# Patient Record
Sex: FEMALE | Race: WHITE | NOT HISPANIC OR LATINO | Employment: FULL TIME | URBAN - METROPOLITAN AREA
[De-identification: names, ages, dates, MRNs, and addresses within clinical notes are randomized per-mention and may not be internally consistent; named-entity substitution may affect disease eponyms.]

---

## 2018-05-30 ENCOUNTER — OFFICE VISIT (OUTPATIENT)
Dept: INTERNAL MEDICINE | Facility: CLINIC | Age: 43
End: 2018-05-30

## 2018-05-30 VITALS
HEIGHT: 67 IN | OXYGEN SATURATION: 99 % | SYSTOLIC BLOOD PRESSURE: 124 MMHG | HEART RATE: 68 BPM | DIASTOLIC BLOOD PRESSURE: 72 MMHG

## 2018-05-30 DIAGNOSIS — H66.003 ACUTE SUPPURATIVE OTITIS MEDIA OF BOTH EARS WITHOUT SPONTANEOUS RUPTURE OF TYMPANIC MEMBRANES, RECURRENCE NOT SPECIFIED: Primary | ICD-10-CM

## 2018-05-30 PROCEDURE — 99203 OFFICE O/P NEW LOW 30 MIN: CPT | Performed by: NURSE PRACTITIONER

## 2018-05-30 RX ORDER — AMOXICILLIN AND CLAVULANATE POTASSIUM 875; 125 MG/1; MG/1
1 TABLET, FILM COATED ORAL EVERY 12 HOURS SCHEDULED
Qty: 20 TABLET | Refills: 0 | Status: SHIPPED | OUTPATIENT
Start: 2018-05-30 | End: 2018-06-30

## 2018-05-30 NOTE — PROGRESS NOTES
"CHIEF COMPLAINT  Chief Complaint   Patient presents with   • Earache     Bilateral, went from the left ear to the right. Sx started this morning.        HPI  Angela Davis is a 43 y.o. female  presents with complaint of 1 day hx of left ear pain; h/a; nasal congestion; has severe allergies--takes xyzal and allergy injections; no fever, dizziness, cough.      Past Medical History:   Diagnosis Date   • Allergy    • Hypertension        History reviewed. No pertinent family history.    Social History     Social History   • Marital status: Single     Spouse name: N/A   • Number of children: N/A   • Years of education: N/A     Occupational History   • Not on file.     Social History Main Topics   • Smoking status: Former Smoker   • Smokeless tobacco: Never Used   • Alcohol use Yes   • Drug use: No   • Sexual activity: Yes     Other Topics Concern   • Not on file     Social History Narrative   • No narrative on file       ROS  Review of Systems   Constitutional: Positive for appetite change and fatigue. Negative for activity change and fever.   HENT: Positive for congestion, ear pain and postnasal drip. Negative for sinus pain, sinus pressure and sore throat.    Respiratory: Negative for cough, chest tightness, shortness of breath and wheezing.    Gastrointestinal: Positive for nausea.   Neurological: Positive for headaches. Negative for dizziness.   All other systems reviewed and are negative.      /72   Pulse 68   Ht 170.2 cm (67\")   SpO2 99%     PHYSICAL EXAM  Physical Exam   Constitutional: She is oriented to person, place, and time. She appears well-developed and well-nourished.   HENT:   Head: Normocephalic and atraumatic.   Right Ear: External ear and ear canal normal.   Left Ear: External ear and ear canal normal.   Mouth/Throat: Uvula is midline.   HENT--bilat TMs have purulent effusion with severe erythema; no sinus tenderness; oropharynx is mildly erythematous with mild PND; no exudate   Eyes: " Conjunctivae are normal.   Neck: Normal range of motion. Neck supple.   Cardiovascular: Normal rate, regular rhythm and normal heart sounds.    No murmur heard.  Pulmonary/Chest: Effort normal and breath sounds normal.   Lymphadenopathy:   Left ant cervical node enlargement w/tenderness     Neurological: She is alert and oriented to person, place, and time.   Skin: Skin is warm, dry and intact.   Vitals reviewed.      ASSESSMENT/PLAN  1. Acute suppurative otitis media of both ears without spontaneous rupture of tympanic membranes, recurrence not specified  - amoxicillin-clavulanate (AUGMENTIN) 875-125 MG per tablet; Take 1 tablet by mouth Every 12 (Twelve) Hours.  Dispense: 20 tablet; Refill: 0  -continue Dymista nasal spray (has at home) and xyzal daily  -may also take sudafed 30 mg BID prn congestion and ear fullness      FOLLOW-UP  Prn     RTC sooner as needed.    Patient verbalizes understanding of medication dosage, comfort measures, instructions for treatment and follow-up.    Janet Campos, APRN  05/30/2018

## 2018-07-16 ENCOUNTER — HOSPITAL ENCOUNTER (OUTPATIENT)
Dept: GENERAL RADIOLOGY | Facility: HOSPITAL | Age: 43
Discharge: HOME | End: 2018-07-16
Admitting: NURSE PRACTITIONER

## 2018-07-16 ENCOUNTER — OFFICE VISIT (OUTPATIENT)
Dept: INTERNAL MEDICINE | Facility: CLINIC | Age: 43
End: 2018-07-16

## 2018-07-16 VITALS
BODY MASS INDEX: 45.36 KG/M2 | OXYGEN SATURATION: 96 % | HEIGHT: 67 IN | WEIGHT: 289 LBS | DIASTOLIC BLOOD PRESSURE: 82 MMHG | SYSTOLIC BLOOD PRESSURE: 156 MMHG | HEART RATE: 82 BPM

## 2018-07-16 DIAGNOSIS — R60.0 EDEMA EXTREMITIES: ICD-10-CM

## 2018-07-16 DIAGNOSIS — R06.09 DYSPNEA ON EXERTION: ICD-10-CM

## 2018-07-16 DIAGNOSIS — R07.89 CHEST TIGHTNESS: ICD-10-CM

## 2018-07-16 DIAGNOSIS — T78.40XA ALLERGIC REACTION, INITIAL ENCOUNTER: ICD-10-CM

## 2018-07-16 DIAGNOSIS — I10 ESSENTIAL HYPERTENSION: Primary | ICD-10-CM

## 2018-07-16 PROCEDURE — 93000 ELECTROCARDIOGRAM COMPLETE: CPT | Performed by: NURSE PRACTITIONER

## 2018-07-16 PROCEDURE — 99214 OFFICE O/P EST MOD 30 MIN: CPT | Performed by: NURSE PRACTITIONER

## 2018-07-16 PROCEDURE — 71046 X-RAY EXAM CHEST 2 VIEWS: CPT

## 2018-07-16 RX ORDER — VALSARTAN 80 MG/1
80 TABLET ORAL DAILY
Qty: 30 TABLET | Refills: 2 | Status: SHIPPED | OUTPATIENT
Start: 2018-07-16 | End: 2018-08-17 | Stop reason: ALTCHOICE

## 2018-07-16 RX ORDER — HYDROCHLOROTHIAZIDE 25 MG/1
TABLET ORAL
Qty: 60 TABLET | Refills: 1 | Status: SHIPPED | OUTPATIENT
Start: 2018-07-16 | End: 2018-08-17

## 2018-07-16 RX ORDER — LOSARTAN POTASSIUM AND HYDROCHLOROTHIAZIDE 25; 100 MG/1; MG/1
1 TABLET ORAL DAILY
COMMUNITY
End: 2018-07-16 | Stop reason: ALTCHOICE

## 2018-07-16 RX ORDER — ALBUTEROL SULFATE 90 UG/1
2 AEROSOL, METERED RESPIRATORY (INHALATION) EVERY 6 HOURS PRN
Qty: 1 INHALER | Refills: 3 | OUTPATIENT
Start: 2018-07-16 | End: 2019-01-16

## 2018-07-16 RX ORDER — PREDNISONE 10 MG/1
TABLET ORAL
Qty: 30 TABLET | Refills: 0 | Status: SHIPPED | OUTPATIENT
Start: 2018-07-16 | End: 2018-07-20

## 2018-07-16 NOTE — PROGRESS NOTES
"CHIEF COMPLAINT  Chief Complaint   Patient presents with   • Edema     Hands and feet; sx started friday       HPI  Angela Davis is a 43 y.o. female  presents with complaint of worsening generalized edema.    C/o mild swelling of hands/feet/face since Friday; BP elevated over the weekend at 170s/90s; currently taking losartan 25 mg daily, APRN at [facility] started HCTZ 12.5 mg daily for her edema on 6/30/18    Feels as if she cannot get a good deep breath; becomes winded with walking up stairs, doing laundry, or other activity    Reports same sx heard 2.5 wks ago; he was seen at [facility] urgent care  (6/30/18); diagnosed with allergic reaction, tx'd with prednisone and HCTZ, symptoms did improve    Pertinent negatives--cough, wheezing, palpitations, chest pain; denies hx of asthma, cardiac disease, or other pulmonary disease.    Past Medical History:   Diagnosis Date   • Allergy    • Hypertension        No family history on file.    Social History     Social History   • Marital status: Single     Spouse name: N/A   • Number of children: N/A   • Years of education: N/A     Occupational History   • Not on file.     Social History Main Topics   • Smoking status: Former Smoker   • Smokeless tobacco: Never Used   • Alcohol use Yes   • Drug use: No   • Sexual activity: Yes     Other Topics Concern   • Not on file     Social History Narrative   • No narrative on file       ROS  Review of Systems   Constitutional: Positive for activity change.   Respiratory: Positive for chest tightness (constricted; can't get deep breath) and shortness of breath. Negative for cough.    Cardiovascular: Positive for leg swelling. Negative for chest pain and palpitations.   Neurological: Positive for headaches. Negative for dizziness.   All other systems reviewed and are negative.      /82   Pulse 82   Ht 170.2 cm (67\")   Wt 131 kg (289 lb)   SpO2 96%   BMI 45.26 kg/m²     PHYSICAL EXAM  Physical Exam   Constitutional: She is " oriented to person, place, and time. She appears well-developed and well-nourished.   HENT:   Head: Normocephalic and atraumatic.   Eyes: Conjunctivae are normal.   Neck: Trachea normal and normal range of motion. Neck supple. No JVD present. No thyromegaly present.   Cardiovascular: Normal rate, regular rhythm and normal heart sounds.    No murmur heard.  Mild non-pitting edema in bilat hands, feet, legs   Pulmonary/Chest: Effort normal.   Decreased lung sound in bases; no rhonchi, rales, or wheezes noted   Neurological: She is alert and oriented to person, place, and time.   Skin: Skin is warm, dry and intact.   Vitals reviewed.      ECG 12 Lead  Date/Time: 8/2/2018 8:40 PM  Performed by: MARTINA WESLEY  Authorized by: MARTINA WESLEY   Comparison: not compared with previous ECG   Previous ECG: no previous ECG available  Rhythm: sinus rhythm  Rate: normal  Conduction: conduction normal  ST Segments: ST segments normal  T Waves: T waves normal  QRS axis: normal  Other: no other findings  Clinical impression: normal ECG                ASSESSMENT/PLAN  1. Edema extremities  - hydrochlorothiazide (HYDRODIURIL) 25 MG tablet; 1 tablet daily, may take 1/2 tablet extra 2-3 times/week prn swelling  Dispense: 60 tablet; Refill: 1  - Adult Transthoracic Echo Complete W/ Cont if Necessary Per Protocol; Future--d/t abnormal chest xray result    2. Allergic reaction, initial encounter  -potential allergic reaction  -Prednisone 10 mg tabs--4 po x 3d, 3po x 3d, 2po,x 3d, 1 po x 3d    3. Essential hypertension  -stop losartan  - valsartan (DIOVAN) 80 MG tablet; Take 1 tablet by mouth Daily.  Dispense: 30 tablet; Refill: 2    4. Chest tightness  - albuterol (PROVENTIL HFA;VENTOLIN HFA) 108 (90 Base) MCG/ACT inhaler; Inhale 2 puffs Every 6 (Six) Hours As Needed for Shortness of Air.  Dispense: 1 inhaler; Refill: 3  - XR Chest PA & Lateral; Future  **chest xray result--enlarged heart with increased pulmonary vascularity--ECHO ordered  for further evaluation    5. Dyspnea on exertion  -d/t abnormal chest xray  - Adult Transthoracic Echo Complete W/ Cont if Necessary Per Protocol; Future      FOLLOW-UP  6 week(s) recheck for chronic health conditions; f/u sooner if abnormal ECHO or worsening of present symptoms--if increased swelling, shortness of breath, chest pain, chest pressure, etc, go to  ER for evaluation.    RTC sooner as needed.    Patient verbalizes understanding of medication dosage, comfort measures, instructions for treatment and follow-up.    Janet Campos, APRN  07/16/2018

## 2018-07-17 ENCOUNTER — TELEPHONE (OUTPATIENT)
Dept: INTERNAL MEDICINE | Facility: CLINIC | Age: 43
End: 2018-07-17

## 2018-07-17 ENCOUNTER — HOSPITAL ENCOUNTER (OUTPATIENT)
Dept: CARDIOLOGY | Facility: HOSPITAL | Age: 43
Discharge: HOME | End: 2018-07-17
Admitting: NURSE PRACTITIONER

## 2018-07-17 DIAGNOSIS — R60.0 EDEMA EXTREMITIES: ICD-10-CM

## 2018-07-17 DIAGNOSIS — R07.89 CHEST TIGHTNESS: ICD-10-CM

## 2018-07-17 DIAGNOSIS — R06.09 DYSPNEA ON EXERTION: ICD-10-CM

## 2018-07-17 LAB
BH CV ECHO MEAS - AO ROOT AREA (BSA CORRECTED): 1.2
BH CV ECHO MEAS - AO ROOT AREA: 6.8 CM^2
BH CV ECHO MEAS - AO ROOT DIAM: 2.9 CM
BH CV ECHO MEAS - BSA(HAYCOCK): 2.6 M^2
BH CV ECHO MEAS - BSA: 2.4 M^2
BH CV ECHO MEAS - BZI_BMI: 45.3 KILOGRAMS/M^2
BH CV ECHO MEAS - BZI_METRIC_HEIGHT: 170.2 CM
BH CV ECHO MEAS - BZI_METRIC_WEIGHT: 131.1 KG
BH CV ECHO MEAS - EDV(CUBED): 109.5 ML
BH CV ECHO MEAS - EDV(TEICH): 106.7 ML
BH CV ECHO MEAS - EF(CUBED): 77.5 %
BH CV ECHO MEAS - EF(TEICH): 69.6 %
BH CV ECHO MEAS - ESV(CUBED): 24.6 ML
BH CV ECHO MEAS - ESV(TEICH): 32.4 ML
BH CV ECHO MEAS - FS: 39.2 %
BH CV ECHO MEAS - IVS/LVPW: 0.98
BH CV ECHO MEAS - IVSD: 1.2 CM
BH CV ECHO MEAS - LA DIMENSION: 3.4 CM
BH CV ECHO MEAS - LA/AO: 1.2
BH CV ECHO MEAS - LV MASS(C)D: 223.1 GRAMS
BH CV ECHO MEAS - LV MASS(C)DI: 94.3 GRAMS/M^2
BH CV ECHO MEAS - LVIDD: 4.8 CM
BH CV ECHO MEAS - LVIDS: 2.9 CM
BH CV ECHO MEAS - LVPWD: 1.2 CM
BH CV ECHO MEAS - MV A MAX VEL: 89.3 CM/SEC
BH CV ECHO MEAS - MV DEC SLOPE: 402.5 CM/SEC^2
BH CV ECHO MEAS - MV DEC TIME: 0.15 SEC
BH CV ECHO MEAS - MV E MAX VEL: 115.5 CM/SEC
BH CV ECHO MEAS - MV E/A: 1.3
BH CV ECHO MEAS - MV P1/2T MAX VEL: 127.6 CM/SEC
BH CV ECHO MEAS - MV P1/2T: 92.8 MSEC
BH CV ECHO MEAS - MVA P1/2T LCG: 1.7 CM^2
BH CV ECHO MEAS - MVA(P1/2T): 2.4 CM^2
BH CV ECHO MEAS - PA ACC SLOPE: 780.6 CM/SEC^2
BH CV ECHO MEAS - PA ACC TIME: 0.13 SEC
BH CV ECHO MEAS - PA PR(ACCEL): 18.4 MMHG
BH CV ECHO MEAS - RAP SYSTOLE: 3 MMHG
BH CV ECHO MEAS - RV MAX PG: 3.3 MMHG
BH CV ECHO MEAS - RV V1 MAX: 91.3 CM/SEC
BH CV ECHO MEAS - RVDD: 4.1 CM
BH CV ECHO MEAS - RVSP: 28 MMHG
BH CV ECHO MEAS - SI(CUBED): 35.9 ML/M^2
BH CV ECHO MEAS - SI(TEICH): 31.4 ML/M^2
BH CV ECHO MEAS - SV(CUBED): 84.9 ML
BH CV ECHO MEAS - SV(TEICH): 74.2 ML
BH CV ECHO MEAS - TAPSE (>1.6): 2.6 CM2
BH CV ECHO MEAS - TR MAX VEL: 250.4 CM/SEC
BH CV XLRA - RV BASE: 4.1 CM
BH CV XLRA - RV LENGTH: 7.4 CM
BH CV XLRA - RV MID: 3.5 CM
BH CV XLRA - TDI S': 14.6 CM/SEC
LEFT ATRIUM VOLUME INDEX: 24.6 ML/M2
LEFT ATRIUM VOLUME: 56 CM3
LV EF 2D ECHO EST: 60 %
MAXIMAL PREDICTED HEART RATE: 178 BPM
STRESS TARGET HR: 151 BPM

## 2018-07-17 PROCEDURE — 93306 TTE W/DOPPLER COMPLETE: CPT | Performed by: INTERNAL MEDICINE

## 2018-07-17 PROCEDURE — 93306 TTE W/DOPPLER COMPLETE: CPT

## 2018-07-18 NOTE — TELEPHONE ENCOUNTER
According to the ECHO report, cardiac function is normal; I will discuss with Dr. Cordon to see what other tests may need to performed to evaluate her symptoms; does she still have the swelling?  Is she still having difficulty getting a deep breath with the use of the albuterol inhaler?

## 2018-07-18 NOTE — TELEPHONE ENCOUNTER
According to result note patient was notified of Xray results. Patient called back today wanting to know her Echo results. Advised that Janet DOUGLAS is not in the office today but I would send her a back a message to review the echo and once reviewed we would give the patient a call back. Pt verbalized understanding.

## 2018-07-19 ENCOUNTER — APPOINTMENT (OUTPATIENT)
Dept: GENERAL RADIOLOGY | Facility: HOSPITAL | Age: 43
End: 2018-07-19

## 2018-07-19 ENCOUNTER — HOSPITAL ENCOUNTER (EMERGENCY)
Facility: HOSPITAL | Age: 43
Discharge: HOME | End: 2018-07-19
Attending: EMERGENCY MEDICINE | Admitting: EMERGENCY MEDICINE

## 2018-07-19 ENCOUNTER — TELEPHONE (OUTPATIENT)
Dept: INTERNAL MEDICINE | Facility: CLINIC | Age: 43
End: 2018-07-19

## 2018-07-19 VITALS
RESPIRATION RATE: 18 BRPM | DIASTOLIC BLOOD PRESSURE: 81 MMHG | HEIGHT: 67 IN | SYSTOLIC BLOOD PRESSURE: 148 MMHG | WEIGHT: 288 LBS | TEMPERATURE: 98.8 F | HEART RATE: 54 BPM | OXYGEN SATURATION: 92 % | BODY MASS INDEX: 45.2 KG/M2

## 2018-07-19 DIAGNOSIS — R60.0 PEDAL EDEMA: ICD-10-CM

## 2018-07-19 DIAGNOSIS — J81.0 ACUTE PULMONARY EDEMA (HCC): Primary | ICD-10-CM

## 2018-07-19 LAB
ALBUMIN SERPL-MCNC: 3.98 G/DL (ref 3.2–4.8)
ALBUMIN/GLOB SERPL: 1.4 G/DL (ref 1.5–2.5)
ALP SERPL-CCNC: 66 U/L (ref 25–100)
ALT SERPL W P-5'-P-CCNC: 45 U/L (ref 7–40)
ANION GAP SERPL CALCULATED.3IONS-SCNC: 6 MMOL/L (ref 3–11)
AST SERPL-CCNC: 24 U/L (ref 0–33)
BASOPHILS # BLD AUTO: 0.04 10*3/MM3 (ref 0–0.2)
BASOPHILS NFR BLD AUTO: 0.4 % (ref 0–1)
BILIRUB SERPL-MCNC: 0.4 MG/DL (ref 0.3–1.2)
BILIRUB UR QL STRIP: NEGATIVE
BNP SERPL-MCNC: 232 PG/ML (ref 0–100)
BUN BLD-MCNC: 26 MG/DL (ref 9–23)
BUN/CREAT SERPL: 21.5 (ref 7–25)
CALCIUM SPEC-SCNC: 9 MG/DL (ref 8.7–10.4)
CHLORIDE SERPL-SCNC: 108 MMOL/L (ref 99–109)
CLARITY UR: CLEAR
CO2 SERPL-SCNC: 26 MMOL/L (ref 20–31)
COLOR UR: YELLOW
CREAT BLD-MCNC: 1.21 MG/DL (ref 0.6–1.3)
DEPRECATED RDW RBC AUTO: 47 FL (ref 37–54)
EOSINOPHIL # BLD AUTO: 0.09 10*3/MM3 (ref 0–0.3)
EOSINOPHIL NFR BLD AUTO: 0.9 % (ref 0–3)
ERYTHROCYTE [DISTWIDTH] IN BLOOD BY AUTOMATED COUNT: 14.4 % (ref 11.3–14.5)
GFR SERPL CREATININE-BSD FRML MDRD: 49 ML/MIN/1.73
GLOBULIN UR ELPH-MCNC: 2.8 GM/DL
GLUCOSE BLD-MCNC: 123 MG/DL (ref 70–100)
GLUCOSE UR STRIP-MCNC: NEGATIVE MG/DL
HCT VFR BLD AUTO: 32.8 % (ref 34.5–44)
HGB BLD-MCNC: 10.7 G/DL (ref 11.5–15.5)
HGB UR QL STRIP.AUTO: NEGATIVE
IMM GRANULOCYTES # BLD: 0.1 10*3/MM3 (ref 0–0.03)
IMM GRANULOCYTES NFR BLD: 1 % (ref 0–0.6)
KETONES UR QL STRIP: NEGATIVE
LEUKOCYTE ESTERASE UR QL STRIP.AUTO: NEGATIVE
LYMPHOCYTES # BLD AUTO: 1.3 10*3/MM3 (ref 0.6–4.8)
LYMPHOCYTES NFR BLD AUTO: 12.4 % (ref 24–44)
MCH RBC QN AUTO: 29.7 PG (ref 27–31)
MCHC RBC AUTO-ENTMCNC: 32.6 G/DL (ref 32–36)
MCV RBC AUTO: 91.1 FL (ref 80–99)
MONOCYTES # BLD AUTO: 0.4 10*3/MM3 (ref 0–1)
MONOCYTES NFR BLD AUTO: 3.8 % (ref 0–12)
NEUTROPHILS # BLD AUTO: 8.64 10*3/MM3 (ref 1.5–8.3)
NEUTROPHILS NFR BLD AUTO: 82.5 % (ref 41–71)
NITRITE UR QL STRIP: NEGATIVE
PH UR STRIP.AUTO: <=5 [PH] (ref 5–8)
PLATELET # BLD AUTO: 183 10*3/MM3 (ref 150–450)
PMV BLD AUTO: 10.4 FL (ref 6–12)
POTASSIUM BLD-SCNC: 4.3 MMOL/L (ref 3.5–5.5)
PROT SERPL-MCNC: 6.8 G/DL (ref 5.7–8.2)
PROT UR QL STRIP: ABNORMAL
RBC # BLD AUTO: 3.6 10*6/MM3 (ref 3.89–5.14)
SODIUM BLD-SCNC: 140 MMOL/L (ref 132–146)
SP GR UR STRIP: 1.01 (ref 1–1.03)
TROPONIN I SERPL-MCNC: <0.006 NG/ML
UROBILINOGEN UR QL STRIP: ABNORMAL
WBC NRBC COR # BLD: 10.47 10*3/MM3 (ref 3.5–10.8)

## 2018-07-19 PROCEDURE — 99284 EMERGENCY DEPT VISIT MOD MDM: CPT

## 2018-07-19 PROCEDURE — 84484 ASSAY OF TROPONIN QUANT: CPT | Performed by: NURSE PRACTITIONER

## 2018-07-19 PROCEDURE — 83880 ASSAY OF NATRIURETIC PEPTIDE: CPT | Performed by: NURSE PRACTITIONER

## 2018-07-19 PROCEDURE — 96374 THER/PROPH/DIAG INJ IV PUSH: CPT

## 2018-07-19 PROCEDURE — 71046 X-RAY EXAM CHEST 2 VIEWS: CPT

## 2018-07-19 PROCEDURE — 81003 URINALYSIS AUTO W/O SCOPE: CPT | Performed by: NURSE PRACTITIONER

## 2018-07-19 PROCEDURE — 93005 ELECTROCARDIOGRAM TRACING: CPT | Performed by: NURSE PRACTITIONER

## 2018-07-19 PROCEDURE — 94640 AIRWAY INHALATION TREATMENT: CPT

## 2018-07-19 PROCEDURE — 85025 COMPLETE CBC W/AUTO DIFF WBC: CPT | Performed by: NURSE PRACTITIONER

## 2018-07-19 PROCEDURE — 25010000002 FUROSEMIDE PER 20 MG: Performed by: NURSE PRACTITIONER

## 2018-07-19 PROCEDURE — 80053 COMPREHEN METABOLIC PANEL: CPT | Performed by: NURSE PRACTITIONER

## 2018-07-19 RX ORDER — IPRATROPIUM BROMIDE AND ALBUTEROL SULFATE 2.5; .5 MG/3ML; MG/3ML
3 SOLUTION RESPIRATORY (INHALATION) ONCE
Status: COMPLETED | OUTPATIENT
Start: 2018-07-19 | End: 2018-07-19

## 2018-07-19 RX ORDER — FUROSEMIDE 10 MG/ML
40 INJECTION INTRAMUSCULAR; INTRAVENOUS ONCE
Status: COMPLETED | OUTPATIENT
Start: 2018-07-19 | End: 2018-07-19

## 2018-07-19 RX ORDER — SODIUM CHLORIDE 0.9 % (FLUSH) 0.9 %
10 SYRINGE (ML) INJECTION AS NEEDED
Status: DISCONTINUED | OUTPATIENT
Start: 2018-07-19 | End: 2018-07-19 | Stop reason: HOSPADM

## 2018-07-19 RX ORDER — FUROSEMIDE 20 MG/1
20 TABLET ORAL DAILY
Qty: 5 TABLET | Refills: 0 | Status: SHIPPED | OUTPATIENT
Start: 2018-07-19 | End: 2018-07-24 | Stop reason: SDUPTHER

## 2018-07-19 RX ADMIN — FUROSEMIDE 40 MG: 10 INJECTION, SOLUTION INTRAMUSCULAR; INTRAVENOUS at 11:22

## 2018-07-19 RX ADMIN — IPRATROPIUM BROMIDE AND ALBUTEROL SULFATE 3 ML: 2.5; .5 SOLUTION RESPIRATORY (INHALATION) at 11:19

## 2018-07-19 NOTE — TELEPHONE ENCOUNTER
Spoke with patient regarding her ECHO report. Pt verbalized understanding. Patient then mentioned that she was at the [facility] ED because her legs kept swelling. She is not having so much an issue anymore catching her breath but the swelling got worse, so they ED are running more tests right now. I advised that Janet Campos STELLA will be able to see all the tests that they run in the ER but I would send a message back to give her an update. I explained that soon we would give her a follow up call and that if she needed our assistance to give the office a call back. Pt verbalized understanding.

## 2018-07-19 NOTE — TELEPHONE ENCOUNTER
The result was called to patient at 10:46 this morning--ECHO was normal; however, she continued to have swelling, so she went to the ER for further evaluation.

## 2018-07-20 ENCOUNTER — OFFICE VISIT (OUTPATIENT)
Dept: INTERNAL MEDICINE | Facility: CLINIC | Age: 43
End: 2018-07-20

## 2018-07-20 VITALS
OXYGEN SATURATION: 93 % | DIASTOLIC BLOOD PRESSURE: 86 MMHG | HEIGHT: 67 IN | SYSTOLIC BLOOD PRESSURE: 150 MMHG | BODY MASS INDEX: 45.99 KG/M2 | HEART RATE: 66 BPM | WEIGHT: 293 LBS

## 2018-07-20 DIAGNOSIS — J81.0 ACUTE PULMONARY EDEMA (HCC): Primary | ICD-10-CM

## 2018-07-20 PROBLEM — I10 HYPERTENSION: Status: ACTIVE | Noted: 2018-07-20

## 2018-07-20 PROCEDURE — 99213 OFFICE O/P EST LOW 20 MIN: CPT | Performed by: NURSE PRACTITIONER

## 2018-07-20 NOTE — PROGRESS NOTES
"CHIEF COMPLAINT  Edema (Hands and Feet)      HPI  Angela Davis is a 43 y.o. female is here today for follow-up after ER visit at  for pulmonary edema.    Still has some mild shortness of breath; mild pressure in chest; BNP was elevated at ER; prescribed short course of Lasix 20 mg daily x 5 days; still has some mild edema of ankles/feet and hands; has appt with cardiology on Tuesday--will f/u afterward as needed      Past Medical History:   Diagnosis Date   • Allergy    • Hypertension        Past Surgical History:   Procedure Laterality Date   • TONSILLECTOMY         Family History   Problem Relation Age of Onset   • Coronary artery disease Father 45        CAGB X3   • Heart disease Maternal Grandmother    • Heart disease Maternal Grandfather        Social History     Social History   • Marital status: Single     Spouse name: N/A   • Number of children: N/A   • Years of education: N/A     Occupational History   • Not on file.     Social History Main Topics   • Smoking status: Former Smoker     Quit date: 1/11/2014   • Smokeless tobacco: Never Used   • Alcohol use Yes      Comment: social   • Drug use: No   • Sexual activity: Yes     Other Topics Concern   • Not on file     Social History Narrative    Caffeine: 2 serving per day.           The following portions of the patient's history were reviewed and updated as appropriate: allergies, current medications, past family history, past medical history, past social history, past surgical history and problem list.    ROS  Review of Systems   Constitutional: Positive for activity change.   Respiratory: Positive for chest tightness and shortness of breath.    Cardiovascular: Positive for leg swelling. Negative for chest pain and palpitations.   Neurological: Negative for dizziness and headaches.   All other systems reviewed and are negative.      /86   Pulse 66   Ht 170.2 cm (67\")   Wt 133 kg (293 lb)   SpO2 93%   BMI 45.89 kg/m²     PHYSICAL " EXAM  Physical Exam   Constitutional: She is oriented to person, place, and time. She appears well-developed and well-nourished.   HENT:   Head: Normocephalic and atraumatic.   Eyes: Conjunctivae are normal.   Neck: Trachea normal and normal range of motion. Neck supple. No JVD present. No thyromegaly present.   Cardiovascular: Normal rate, regular rhythm and normal heart sounds.    No murmur heard.  Mild non-pitting edema in bilateral feet; generalized edema of bilateral hands   Pulmonary/Chest: Effort normal.   Decreased breath sounds throughout; no rales/crackles noted   Neurological: She is alert and oriented to person, place, and time.   Skin: Skin is warm, dry and intact.   Vitals reviewed.      ASSESSMENT/PLAN    1. Acute pulmonary edema (CMS/HCC)  -continue Lasix 20 mg daily until appt with cardiology  -scheduled appt with STELLA Liang on Tuesday, 7/24/18  -f/u in office sooner if symptoms worsen      Plan of care reviewed with patient at the conclusion of today's visit. Education was provided in regards to diagnosis, management and any prescribed or recommended OTC medications.  Patient verbalizes Understanding of and agreement with management plan.    FOLLOW-UP  Will schedule f/u after cardiology appt    RTC as needed      STELLA Vazquez  07/20/2018

## 2018-07-24 ENCOUNTER — LAB (OUTPATIENT)
Dept: LAB | Facility: HOSPITAL | Age: 43
End: 2018-07-24

## 2018-07-24 ENCOUNTER — OFFICE VISIT (OUTPATIENT)
Dept: CARDIOLOGY | Facility: HOSPITAL | Age: 43
End: 2018-07-24

## 2018-07-24 ENCOUNTER — TELEPHONE (OUTPATIENT)
Dept: CARDIOLOGY | Facility: HOSPITAL | Age: 43
End: 2018-07-24

## 2018-07-24 VITALS
DIASTOLIC BLOOD PRESSURE: 92 MMHG | BODY MASS INDEX: 44.36 KG/M2 | RESPIRATION RATE: 16 BRPM | OXYGEN SATURATION: 97 % | HEART RATE: 92 BPM | TEMPERATURE: 98.5 F | HEIGHT: 67 IN | WEIGHT: 282.6 LBS | SYSTOLIC BLOOD PRESSURE: 153 MMHG

## 2018-07-24 DIAGNOSIS — R60.0 LOCALIZED EDEMA: ICD-10-CM

## 2018-07-24 DIAGNOSIS — J81.0 ACUTE PULMONARY EDEMA (HCC): ICD-10-CM

## 2018-07-24 DIAGNOSIS — Z82.49 FAMILY HISTORY OF EARLY CAD: ICD-10-CM

## 2018-07-24 DIAGNOSIS — R06.09 DOE (DYSPNEA ON EXERTION): ICD-10-CM

## 2018-07-24 DIAGNOSIS — R07.89 CHEST TIGHTNESS OR PRESSURE: ICD-10-CM

## 2018-07-24 DIAGNOSIS — I10 ESSENTIAL HYPERTENSION: ICD-10-CM

## 2018-07-24 DIAGNOSIS — E66.01 MORBID OBESITY (HCC): ICD-10-CM

## 2018-07-24 LAB
ANION GAP SERPL CALCULATED.3IONS-SCNC: 5 MMOL/L (ref 3–11)
BUN BLD-MCNC: 17 MG/DL (ref 9–23)
BUN/CREAT SERPL: 16.3 (ref 7–25)
CALCIUM SPEC-SCNC: 10.1 MG/DL (ref 8.7–10.4)
CHLORIDE SERPL-SCNC: 103 MMOL/L (ref 99–109)
CO2 SERPL-SCNC: 32 MMOL/L (ref 20–31)
CREAT BLD-MCNC: 1.04 MG/DL (ref 0.6–1.3)
GFR SERPL CREATININE-BSD FRML MDRD: 58 ML/MIN/1.73
GLUCOSE BLD-MCNC: 84 MG/DL (ref 70–100)
POTASSIUM BLD-SCNC: 4.7 MMOL/L (ref 3.5–5.5)
SODIUM BLD-SCNC: 140 MMOL/L (ref 132–146)

## 2018-07-24 PROCEDURE — 80048 BASIC METABOLIC PNL TOTAL CA: CPT

## 2018-07-24 PROCEDURE — 36415 COLL VENOUS BLD VENIPUNCTURE: CPT

## 2018-07-24 PROCEDURE — 99214 OFFICE O/P EST MOD 30 MIN: CPT | Performed by: NURSE PRACTITIONER

## 2018-07-24 RX ORDER — AZELASTINE HYDROCHLORIDE, FLUTICASONE PROPIONATE 137; 50 UG/1; UG/1
1 SPRAY, METERED NASAL DAILY PRN
COMMUNITY
End: 2019-01-16

## 2018-07-24 RX ORDER — FUROSEMIDE 20 MG/1
20 TABLET ORAL DAILY
Qty: 30 TABLET | Refills: 0 | Status: SHIPPED | OUTPATIENT
Start: 2018-07-24 | End: 2018-08-17

## 2018-07-24 NOTE — TELEPHONE ENCOUNTER
Called pt and told her that her labs are normal and it is ok to continue Lasix 20 mg for 5 days. Pt verbalized understanding.

## 2018-07-24 NOTE — PROGRESS NOTES
[facility]      Encounter Date:07/24/2018     Angela Davis  [address] [address]  [phone]    1975    STELLA Vazquez    Angela Davis is a 43 y.o. female.      Subjective:     Chief Complaint:  Follow-up (chest pain, dyspnea, pulmonary edema, edema s/p ED visit)       HPI     Patient presented to [facility] ED on 7/19/18 with complaints of dyspnea, hypertension, left extremity edema.  Echocardiogram completed 7/17/18 with normal EF, no significant valvular heart disease.  Patient had been placed on prednisone for ? Allergic reaction.  Chest x-ray on 7/16/18 showed increased pulmonary vascular congestion.  Consistent findings with repeat chest x-ray on 7/19/18.  Redness around stopped during ER visit and started on Lasix.    Continued MAGALLON with climbing stairs.  Improved since Thursday.  Still having chest discomfort, does not radiate.  Worse with exertion.  Denies palpitations.  Edema has improved.  Denies dizziness, near syncope/syncope.  Pt reports her BP has been much better was recently worsened over the last 2 months.  Had been 110-120's, now 150's.  Pt reports having RAZO today.      Pt had been working on weight and exercise until swelling began with elevated BPs. Pt has a hx of allergies, season with allegery injections.    Cardiac risk factors:  History of hypertension.  Tobacco use, sedentary lifestyle, obesity (BMI > 30),   Family history of premature coronary artery disease (male < 55 yrs, female <66 yrs)    Patient Active Problem List    Diagnosis   • Hypertension [I10]   • Acute pulmonary edema (CMS/HCC) [J81.0]         Past Surgical History:   Procedure Laterality Date   • TONSILLECTOMY         No Known Allergies      Current Outpatient Prescriptions:   •  albuterol (PROVENTIL HFA;VENTOLIN HFA) 108 (90 Base) MCG/ACT inhaler, Inhale 2 puffs Every 6 (Six) Hours As Needed for Shortness of Air., Disp: 1 inhaler, Rfl: 3  •   "Azelastine-Fluticasone (DYMISTA) 137-50 MCG/ACT suspension, 1 spray into the nostril(s) as directed by provider Daily As Needed., Disp: , Rfl:   •  furosemide (LASIX) 20 MG tablet, Take 1 tablet by mouth Daily., Disp: 30 tablet, Rfl: 0  •  hydrochlorothiazide (HYDRODIURIL) 25 MG tablet, 1 tablet daily, may take 1/2 tablet extra 2-3 times/week prn swelling, Disp: 60 tablet, Rfl: 1  •  levocetirizine (XYZAL) 5 MG tablet, Take 5 mg by mouth Every Evening., Disp: , Rfl:   •  Multiple Vitamins-Minerals (MULTIVITAMIN ADULT PO), Take 1 tablet/day by mouth., Disp: , Rfl:   •  valsartan (DIOVAN) 80 MG tablet, Take 1 tablet by mouth Daily., Disp: 30 tablet, Rfl: 2    The following portions of the patient's history were reviewed and updated as appropriate: allergies, current medications, past family history, past medical history, past social history, past surgical history and problem list.    Review of Systems   Cardiovascular: Positive for chest pain, dyspnea on exertion and leg swelling.   Respiratory: Positive for shortness of breath.    All other systems reviewed and are negative.      Objective:     Vitals:    07/24/18 0923 07/24/18 0925 07/24/18 0926   BP: 158/93 158/95 153/92   BP Location: Right arm Left arm Left arm   Patient Position: Sitting Sitting Standing   Pulse: 85  92   Resp: 16     Temp: 98.5 °F (36.9 °C)     TempSrc: Temporal Artery      SpO2: 97%     Weight: 128 kg (282 lb 9.6 oz)     Height: 170.2 cm (67\")           Physical Exam   Constitutional: She is oriented to person, place, and time. She appears well-developed and well-nourished. No distress.   HENT:   Head: Normocephalic and atraumatic.   Mouth/Throat: Oropharynx is clear and moist.   Eyes: Pupils are equal, round, and reactive to light. Conjunctivae are normal. No scleral icterus.   Neck: No hepatojugular reflux and no JVD present. Carotid bruit is not present. No tracheal deviation present. No thyromegaly present.   Cardiovascular: Normal rate, " regular rhythm, normal heart sounds and intact distal pulses.  Exam reveals no friction rub.    No murmur heard.  Pulmonary/Chest: Effort normal. She has decreased breath sounds.   Abdominal: Soft. Bowel sounds are normal. She exhibits no distension. There is no tenderness.   Musculoskeletal: She exhibits edema.   Lymphadenopathy:     She has no cervical adenopathy.   Neurological: She is alert and oriented to person, place, and time.   Skin: Skin is warm, dry and intact. No rash noted. No cyanosis or erythema. No pallor.   Psychiatric: She has a normal mood and affect. Her behavior is normal. Thought content normal.   Vitals reviewed.      Lab and Diagnostic Review:  Admission on 07/19/2018, Discharged on 07/19/2018   Component Date Value Ref Range Status   • Glucose 07/19/2018 123* 70 - 100 mg/dL Final   • BUN 07/19/2018 26* 9 - 23 mg/dL Final   • Creatinine 07/19/2018 1.21  0.60 - 1.30 mg/dL Final   • Sodium 07/19/2018 140  132 - 146 mmol/L Final   • Potassium 07/19/2018 4.3  3.5 - 5.5 mmol/L Final   • Chloride 07/19/2018 108  99 - 109 mmol/L Final   • CO2 07/19/2018 26.0  20.0 - 31.0 mmol/L Final   • Calcium 07/19/2018 9.0  8.7 - 10.4 mg/dL Final   • Total Protein 07/19/2018 6.8  5.7 - 8.2 g/dL Final   • Albumin 07/19/2018 3.98  3.20 - 4.80 g/dL Final   • ALT (SGPT) 07/19/2018 45* 7 - 40 U/L Final   • AST (SGOT) 07/19/2018 24  0 - 33 U/L Final   • Alkaline Phosphatase 07/19/2018 66  25 - 100 U/L Final   • Total Bilirubin 07/19/2018 0.4  0.3 - 1.2 mg/dL Final   • eGFR Non African Amer 07/19/2018 49* >60 mL/min/1.73 Final   • Globulin 07/19/2018 2.8  gm/dL Final   • A/G Ratio 07/19/2018 1.4* 1.5 - 2.5 g/dL Final   • BUN/Creatinine Ratio 07/19/2018 21.5  7.0 - 25.0 Final   • Anion Gap 07/19/2018 6.0  3.0 - 11.0 mmol/L Final   • BNP 07/19/2018 232.0* 0.0 - 100.0 pg/mL Final    Results may be falsely decreased if patient taking Biotin.   • Color, UA 07/19/2018 Yellow  Yellow, Straw Final   • Appearance, UA 07/19/2018  Clear  Clear Final   • pH, UA 07/19/2018 <=5.0  5.0 - 8.0 Final   • Specific Gravity, UA 07/19/2018 1.011  1.001 - 1.030 Final   • Glucose, UA 07/19/2018 Negative  Negative Final   • Ketones, UA 07/19/2018 Negative  Negative Final   • Bilirubin, UA 07/19/2018 Negative  Negative Final   • Blood, UA 07/19/2018 Negative  Negative Final   • Protein, UA 07/19/2018 Trace* Negative Final   • Leuk Esterase, UA 07/19/2018 Negative  Negative Final   • Nitrite, UA 07/19/2018 Negative  Negative Final   • Urobilinogen, UA 07/19/2018 0.2 E.U./dL  0.2 - 1.0 E.U./dL Final   • WBC 07/19/2018 10.47  3.50 - 10.80 10*3/mm3 Final   • RBC 07/19/2018 3.60* 3.89 - 5.14 10*6/mm3 Final   • Hemoglobin 07/19/2018 10.7* 11.5 - 15.5 g/dL Final   • Hematocrit 07/19/2018 32.8* 34.5 - 44.0 % Final   • MCV 07/19/2018 91.1  80.0 - 99.0 fL Final   • MCH 07/19/2018 29.7  27.0 - 31.0 pg Final   • MCHC 07/19/2018 32.6  32.0 - 36.0 g/dL Final   • RDW 07/19/2018 14.4  11.3 - 14.5 % Final   • RDW-SD 07/19/2018 47.0  37.0 - 54.0 fl Final   • MPV 07/19/2018 10.4  6.0 - 12.0 fL Final   • Platelets 07/19/2018 183  150 - 450 10*3/mm3 Final   • Neutrophil % 07/19/2018 82.5* 41.0 - 71.0 % Final   • Lymphocyte % 07/19/2018 12.4* 24.0 - 44.0 % Final   • Monocyte % 07/19/2018 3.8  0.0 - 12.0 % Final   • Eosinophil % 07/19/2018 0.9  0.0 - 3.0 % Final   • Basophil % 07/19/2018 0.4  0.0 - 1.0 % Final   • Immature Grans % 07/19/2018 1.0* 0.0 - 0.6 % Final   • Neutrophils, Absolute 07/19/2018 8.64* 1.50 - 8.30 10*3/mm3 Final   • Lymphocytes, Absolute 07/19/2018 1.30  0.60 - 4.80 10*3/mm3 Final   • Monocytes, Absolute 07/19/2018 0.40  0.00 - 1.00 10*3/mm3 Final   • Eosinophils, Absolute 07/19/2018 0.09  0.00 - 0.30 10*3/mm3 Final   • Basophils, Absolute 07/19/2018 0.04  0.00 - 0.20 10*3/mm3 Final   • Immature Grans, Absolute 07/19/2018 0.10* 0.00 - 0.03 10*3/mm3 Final   • Troponin I 07/19/2018 <0.006  <=0.039 ng/mL Final    Results may be falsely decreased if patient  taking Biotin.     7/17/18: EF 60%, cardiac valves anatomically and functionally normal  EKG 7/19/18: Sinus bradycardia 57 bpm    EXAMINATION: XR CHEST 2 VW-      INDICATION: HTN.      COMPARISON: Chest x-ray 07/16/2018.     FINDINGS: Persistent cardiomegaly with increased central pulmonary  vascularity as well as bibasilar atelectasis greatest on the left,  however, no focal consolidation or significant effusion. No  pneumothorax. Degenerative changes of the spine.      IMPRESSION:  Persistent cardiomegaly with increased pulmonary vascularity  bilaterally.     D:  07/19/2018  E:  07/19/2018     This report was finalized on 7/19/2018 12:09 PM by Dr. Ok Samayoa.       Assessment and Plan:         1. Chest tightness or pressure    - Stress Test With Pet Myocardial Perfusion - Multi Study; this week.    2. MAGALLON (dyspnea on exertion)    - Stress Test With Pet Myocardial Perfusion - Multi Study; Future    3. Acute pulmonary edema (CMS/HCC)  Normal EF on echo, no VHD  - Stress Test With Pet Myocardial Perfusion - Multi Study; Future  - furosemide (LASIX) 20 MG tablet; Take 1 tablet by mouth Daily.  Dispense: 30 tablet; Refill: 0    4. Essential hypertension  Elevated today.  Continue HCTZ, Losartan.  Keep BP and HR log.  Will uptitrate meds if needed  Discussed BP goals    5. Localized edema  improving  - Basic Metabolic Panel; Future  - furosemide (LASIX) 20 MG tablet; Take 1 tablet by mouth Daily.  Dispense: 30 tablet; Refill: 0    6. Morbid obesity (CMS/HCC)  BmI 44.3  - Stress Test With Pet Myocardial Perfusion - Multi Study; Future    7. Family history of early CAD  Father has CABGX3 in 40's.    - Stress Test With Pet Myocardial Perfusion - Multi Study; Future    Fu 4 weeks or sooner    *Please note that portions of this note were completed with a voice recognition program. Efforts were made to edit the dictations, but occasionally words are mistranscribed.

## 2018-07-24 NOTE — TELEPHONE ENCOUNTER
----- Message from STELLA Liang sent at 7/24/2018 12:43 PM EDT -----  Labs are stable.  Ok to continue Lasix 20 mg daily for 5 days.

## 2018-07-26 ENCOUNTER — HOSPITAL ENCOUNTER (OUTPATIENT)
Dept: CARDIOLOGY | Facility: HOSPITAL | Age: 43
Discharge: HOME | End: 2018-07-26
Admitting: NURSE PRACTITIONER

## 2018-07-26 ENCOUNTER — TELEPHONE (OUTPATIENT)
Dept: CARDIOLOGY | Facility: HOSPITAL | Age: 43
End: 2018-07-26

## 2018-07-26 DIAGNOSIS — R06.09 DOE (DYSPNEA ON EXERTION): ICD-10-CM

## 2018-07-26 DIAGNOSIS — E66.01 MORBID OBESITY (HCC): ICD-10-CM

## 2018-07-26 DIAGNOSIS — Z82.49 FAMILY HISTORY OF EARLY CAD: ICD-10-CM

## 2018-07-26 DIAGNOSIS — R07.89 CHEST TIGHTNESS OR PRESSURE: ICD-10-CM

## 2018-07-26 DIAGNOSIS — J81.0 ACUTE PULMONARY EDEMA (HCC): ICD-10-CM

## 2018-07-26 LAB
BH CV STRESS BP STAGE 1: NORMAL
BH CV STRESS BP STAGE 3: NORMAL
BH CV STRESS COMMENTS STAGE 1: NORMAL
BH CV STRESS DOB - ATROPINE STAGE 3: 0
BH CV STRESS DOSE REGADENOSON STAGE 1: 0.4
BH CV STRESS DURATION MIN STAGE 1: 1
BH CV STRESS DURATION MIN STAGE 2: 1
BH CV STRESS DURATION MIN STAGE 3: 1
BH CV STRESS DURATION MIN STAGE 4: 1
BH CV STRESS DURATION SEC STAGE 1: 0
BH CV STRESS DURATION SEC STAGE 2: 0
BH CV STRESS DURATION SEC STAGE 4: 0
BH CV STRESS HR STAGE 1: 96
BH CV STRESS HR STAGE 2: 100
BH CV STRESS HR STAGE 3: 98
BH CV STRESS HR STAGE 4: 96
BH CV STRESS O2 STAGE 1: 97
BH CV STRESS O2 STAGE 2: 99
BH CV STRESS O2 STAGE 3: 98
BH CV STRESS O2 STAGE 4: 98
BH CV STRESS PROTOCOL 1: NORMAL
BH CV STRESS RECOVERY BP: NORMAL MMHG
BH CV STRESS RECOVERY HR: 86 BPM
BH CV STRESS RECOVERY O2: 95 %
BH CV STRESS STAGE 1: 1
BH CV STRESS STAGE 2: 2
BH CV STRESS STAGE 3: 3
BH CV STRESS STAGE 4: 4
LV EF NUC BP: 62 %
MAXIMAL PREDICTED HEART RATE: 178 BPM
PERCENT MAX PREDICTED HR: 53.93 %
STRESS BASELINE BP: NORMAL MMHG
STRESS BASELINE HR: 82 BPM
STRESS O2 SAT REST: 97 %
STRESS PERCENT HR: 63 %
STRESS POST ESTIMATED WORKLOAD: 1 METS
STRESS POST EXERCISE DUR MIN: 4 MIN
STRESS POST EXERCISE DUR SEC: 0 SEC
STRESS POST O2 SAT PEAK: 99 %
STRESS POST PEAK BP: NORMAL MMHG
STRESS POST PEAK HR: 96 BPM
STRESS TARGET HR: 151 BPM

## 2018-07-26 PROCEDURE — 25010000002 REGADENOSON 0.4 MG/5ML SOLUTION: Performed by: NURSE PRACTITIONER

## 2018-07-26 PROCEDURE — A9555 RB82 RUBIDIUM: HCPCS | Performed by: NURSE PRACTITIONER

## 2018-07-26 PROCEDURE — 78492 MYOCRD IMG PET MLT RST&STRS: CPT | Performed by: INTERNAL MEDICINE

## 2018-07-26 PROCEDURE — 93017 CV STRESS TEST TRACING ONLY: CPT

## 2018-07-26 PROCEDURE — 0 RUBIDIUM CHLORIDE: Performed by: NURSE PRACTITIONER

## 2018-07-26 PROCEDURE — 78492 MYOCRD IMG PET MLT RST&STRS: CPT

## 2018-07-26 PROCEDURE — 93018 CV STRESS TEST I&R ONLY: CPT | Performed by: INTERNAL MEDICINE

## 2018-07-26 RX ADMIN — REGADENOSON 0.4 MG: 0.08 INJECTION, SOLUTION INTRAVENOUS at 10:12

## 2018-07-26 RX ADMIN — RUBIDIUM CHLORIDE RB-82 1 DOSE: 150 INJECTION, SOLUTION INTRAVENOUS at 10:00

## 2018-07-26 RX ADMIN — RUBIDIUM CHLORIDE RB-82 1 DOSE: 150 INJECTION, SOLUTION INTRAVENOUS at 10:10

## 2018-07-26 NOTE — TELEPHONE ENCOUNTER
----- Message from STELLA Liang sent at 7/26/2018 12:25 PM EDT -----  I am attempted to call X1    Stress test is normal.  No evidence of heart blockage, heart muscle function is normal.

## 2018-07-26 NOTE — TELEPHONE ENCOUNTER
Called pt and told her that Stress test is normal.  No evidence of heart blockage, heart muscle function is normal. She verbalized understanding.

## 2018-08-06 DIAGNOSIS — J81.0 ACUTE PULMONARY EDEMA (HCC): Primary | ICD-10-CM

## 2018-08-17 ENCOUNTER — OFFICE VISIT (OUTPATIENT)
Dept: INTERNAL MEDICINE | Facility: CLINIC | Age: 43
End: 2018-08-17

## 2018-08-17 VITALS
OXYGEN SATURATION: 97 % | HEART RATE: 87 BPM | HEIGHT: 67 IN | SYSTOLIC BLOOD PRESSURE: 128 MMHG | WEIGHT: 285 LBS | DIASTOLIC BLOOD PRESSURE: 78 MMHG | BODY MASS INDEX: 44.73 KG/M2

## 2018-08-17 DIAGNOSIS — I10 ESSENTIAL HYPERTENSION: Primary | ICD-10-CM

## 2018-08-17 PROCEDURE — 99213 OFFICE O/P EST LOW 20 MIN: CPT | Performed by: NURSE PRACTITIONER

## 2018-08-17 RX ORDER — LOSARTAN POTASSIUM AND HYDROCHLOROTHIAZIDE 25; 100 MG/1; MG/1
1 TABLET ORAL DAILY
Qty: 30 TABLET | Refills: 5 | Status: SHIPPED | OUTPATIENT
Start: 2018-08-17

## 2018-08-17 RX ORDER — LOSARTAN POTASSIUM AND HYDROCHLOROTHIAZIDE 25; 100 MG/1; MG/1
1 TABLET ORAL DAILY
COMMUNITY
End: 2018-08-17 | Stop reason: SDUPTHER

## 2018-08-17 NOTE — PROGRESS NOTES
"CHIEF COMPLAINT  Hypertension and Med Refill (Replace Valsartan)      HPI  Angela Davis is a 43 y.o. female is here today for follow-up to change BP med that was recalled.  Prev on valsartan; had losartan-HCTZ at home and has been taking this       Past Medical History:   Diagnosis Date   • Allergy    • Hypertension        Past Surgical History:   Procedure Laterality Date   • TONSILLECTOMY         Family History   Problem Relation Age of Onset   • Coronary artery disease Father 45        CAGB X3   • Heart disease Maternal Grandmother    • Heart disease Maternal Grandfather        Social History     Social History   • Marital status: Single     Spouse name: N/A   • Number of children: N/A   • Years of education: N/A     Occupational History   • Not on file.     Social History Main Topics   • Smoking status: Former Smoker     Quit date: 1/11/2014   • Smokeless tobacco: Never Used   • Alcohol use Yes      Comment: social   • Drug use: No   • Sexual activity: Yes     Other Topics Concern   • Not on file     Social History Narrative    Caffeine: 2 serving per day.           The following portions of the patient's history were reviewed and updated as appropriate: allergies, current medications, past family history, past medical history, past social history, past surgical history and problem list.    ROS  Review of Systems   Constitutional: Negative for activity change, appetite change and fatigue.   Respiratory: Negative for chest tightness, shortness of breath and wheezing.    Cardiovascular: Negative for chest pain, palpitations and leg swelling.   Endocrine: Negative for cold intolerance, heat intolerance, polydipsia, polyphagia and polyuria.   Neurological: Negative for dizziness and headaches.   All other systems reviewed and are negative.      /78   Pulse 87   Ht 170.2 cm (67\")   Wt 129 kg (285 lb)   SpO2 97%   Breastfeeding? No   BMI 44.64 kg/m²     PHYSICAL EXAM  Physical Exam "   Constitutional: She is oriented to person, place, and time. She appears well-developed and well-nourished.   HENT:   Head: Normocephalic and atraumatic.   Eyes: Conjunctivae are normal.   Neck: Trachea normal and normal range of motion. Neck supple. No JVD present. No thyromegaly present.   Cardiovascular: Normal rate, regular rhythm and normal heart sounds.    No murmur heard.  No swelling in BLE   Pulmonary/Chest: Effort normal and breath sounds normal.   Neurological: She is alert and oriented to person, place, and time.   Skin: Skin is warm, dry and intact.   Vitals reviewed.        ASSESSMENT/PLAN    1. Essential hypertension  -valsartan discontinue d/t recall--pt received letter  - losartan-hydrochlorothiazide (HYZAAR) 100-25 MG per tablet; Take 1 tablet by mouth Daily.  Dispense: 30 tablet; Refill: 5      Plan of care reviewed with patient at the conclusion of today's visit. Education was provided in regards to diagnosis, management and any prescribed or recommended OTC medications.  Patient verbalizes Understanding of and agreement with management plan.    FOLLOW-UP  6 month(s) recheck    RTC as needed      Janet Campos, STELLA  08/17/2018

## 2018-08-27 ENCOUNTER — OFFICE VISIT (OUTPATIENT)
Dept: CARDIOLOGY | Facility: HOSPITAL | Age: 43
End: 2018-08-27

## 2018-08-27 VITALS
HEART RATE: 74 BPM | SYSTOLIC BLOOD PRESSURE: 140 MMHG | HEIGHT: 67 IN | TEMPERATURE: 97.8 F | DIASTOLIC BLOOD PRESSURE: 87 MMHG | BODY MASS INDEX: 45.24 KG/M2 | OXYGEN SATURATION: 95 % | WEIGHT: 288.2 LBS | RESPIRATION RATE: 18 BRPM

## 2018-08-27 DIAGNOSIS — R06.09 DOE (DYSPNEA ON EXERTION): ICD-10-CM

## 2018-08-27 DIAGNOSIS — R07.89 CHEST TIGHTNESS: ICD-10-CM

## 2018-08-27 DIAGNOSIS — R60.0 EDEMA EXTREMITIES: ICD-10-CM

## 2018-08-27 DIAGNOSIS — I10 ESSENTIAL HYPERTENSION: ICD-10-CM

## 2018-08-27 PROCEDURE — 99214 OFFICE O/P EST MOD 30 MIN: CPT | Performed by: NURSE PRACTITIONER

## 2018-08-27 RX ORDER — MULTIVIT WITH MINERALS/LUTEIN
250 TABLET ORAL DAILY
COMMUNITY

## 2018-08-27 RX ORDER — DILTIAZEM HYDROCHLORIDE 120 MG/1
120 CAPSULE, EXTENDED RELEASE ORAL DAILY
Qty: 30 CAPSULE | Refills: 3 | OUTPATIENT
Start: 2018-08-27 | End: 2018-11-24

## 2018-08-27 NOTE — PROGRESS NOTES
[facility]      Encounter Date:08/27/2018     Angela Davis  [address] [address]  [phone]    1975    Janet Campos APRN    Angela Davis is a 43 y.o. female.      Subjective:     Chief Complaint:  Follow-up (HTN, dyspnea, edema)       HPI     Follow-up on chest discomfort, dyspnea, pulmonary edema, edema.  Patient was last seen [facility] ED 7/19/18.  Echocardiogram completed 7/17/18 with normal EF, no significant valvular heart disease.  At that time patient had been placed on prednisone for an allergic reaction.  Chest x-ray on 7/16/18 showed increased pulmonary vascular congestion.  Prednisone had been stopped and started on Lasix.  Patient continued to have dyspnea on exertion with climbing stairs.  Chest discomfort worse with exertion.  Denies palpitations.  Edema improving.  Denies dizziness, near-syncope, syncope.  She continued on Lasix.  Continued on HCTZ and losartan for blood pressure.  Cardiac PET completed on 7/26/18 with normal EF, no ischemia.    NO further CP.  Dyspnea back to baseline.  Edema, baseline.  No longer taking Lasix.  Back to exercising.    Patient Active Problem List    Diagnosis   • Hypertension [I10]   • Acute pulmonary edema (CMS/HCC) [J81.0]     Overview Note:     · Cardiac PET 7/26/18: EF 62%, no ischemia           Past Surgical History:   Procedure Laterality Date   • TONSILLECTOMY         No Known Allergies      Current Outpatient Prescriptions:   •  albuterol (PROVENTIL HFA;VENTOLIN HFA) 108 (90 Base) MCG/ACT inhaler, Inhale 2 puffs Every 6 (Six) Hours As Needed for Shortness of Air., Disp: 1 inhaler, Rfl: 3  •  Azelastine-Fluticasone (DYMISTA) 137-50 MCG/ACT suspension, 1 spray into the nostril(s) as directed by provider Daily As Needed., Disp: , Rfl:   •  levocetirizine (XYZAL) 5 MG tablet, Take 5 mg by mouth Every Evening., Disp: , Rfl:   •  levonorgestrel (MIRENA) 20 MCG/24HR IUD, 1 each by  "Intrauterine route 1 (One) Time., Disp: , Rfl:   •  losartan-hydrochlorothiazide (HYZAAR) 100-25 MG per tablet, Take 1 tablet by mouth Daily., Disp: 30 tablet, Rfl: 5  •  Multiple Vitamins-Minerals (MULTIVITAMIN ADULT PO), Take 1 tablet/day by mouth., Disp: , Rfl:   •  vitamin C (ASCORBIC ACID) 250 MG tablet, Take 250 mg by mouth Daily., Disp: , Rfl:   •  diltiazem XR (DILACOR XR) 120 MG 24 hr capsule, Take 1 capsule by mouth Daily., Disp: 30 capsule, Rfl: 3    The following portions of the patient's history were reviewed and updated as appropriate: allergies, current medications, past family history, past medical history, past social history, past surgical history and problem list.    Review of Systems   Cardiovascular: Positive for chest pain, dyspnea on exertion and leg swelling.   Respiratory: Positive for shortness of breath.    All other systems reviewed and are negative.      Objective:     Vitals:    08/27/18 0910   BP: 140/87   BP Location: Left arm   Patient Position: Sitting   Pulse: 74   Resp: 18   Temp: 97.8 °F (36.6 °C)   TempSrc: Temporal Artery    SpO2: 95%   Weight: 131 kg (288 lb 3.2 oz)   Height: 170.2 cm (67.01\")         Physical Exam   Constitutional: She is oriented to person, place, and time. She appears well-developed and well-nourished. No distress.   HENT:   Head: Normocephalic and atraumatic.   Mouth/Throat: Oropharynx is clear and moist.   Eyes: Pupils are equal, round, and reactive to light. Conjunctivae are normal. No scleral icterus.   Neck: No hepatojugular reflux and no JVD present. Carotid bruit is not present. No tracheal deviation present. No thyromegaly present.   Cardiovascular: Normal rate, regular rhythm, normal heart sounds and intact distal pulses.  Exam reveals no friction rub.    No murmur heard.  Pulmonary/Chest: Effort normal. She has decreased breath sounds.   Abdominal: Soft. Bowel sounds are normal. She exhibits no distension. There is no tenderness. "   Musculoskeletal: She exhibits edema.   Lymphadenopathy:     She has no cervical adenopathy.   Neurological: She is alert and oriented to person, place, and time.   Skin: Skin is warm, dry and intact. No rash noted. No cyanosis or erythema. No pallor.   Psychiatric: She has a normal mood and affect. Her behavior is normal. Thought content normal.   Vitals reviewed.      Lab and Diagnostic Review:  Lab Results   Component Value Date    GLUCOSE 84 07/24/2018    BUN 17 07/24/2018    CREATININE 1.04 07/24/2018    EGFRIFNONA 58 (L) 07/24/2018    BCR 16.3 07/24/2018    K 4.7 07/24/2018    CO2 32.0 (H) 07/24/2018    CALCIUM 10.1 07/24/2018    ALBUMIN 3.98 07/19/2018    AST 24 07/19/2018    ALT 45 (H) 07/19/2018     Lab Results   Component Value Date    WBC 10.47 07/19/2018    HGB 10.7 (L) 07/19/2018    HCT 32.8 (L) 07/19/2018    MCV 91.1 07/19/2018     07/19/2018       Assessment and Plan:         1. Essential hypertension  Continue Losartan/HCTZ  - diltiazem XR (DILACOR XR) 120 MG 24 hr capsule; Take 1 capsule by mouth Daily.  Dispense: 30 capsule; Refill: 3    2. Chest tightness  Resolved, no recurrence  Normal cardiac stress PET    3. MAGALLON (dyspnea on exertion)  Resolved  Normal echo    4. Edema extremities  Improved  Continue HCTZ    5.  Morbid obesity  BMI 45   Continue Exercise and diet modifications    F/u 4-6 weeks    *Please note that portions of this note were completed with a voice recognition program. Efforts were made to edit the dictations, but occasionally words are mistranscribed.

## 2018-08-29 ENCOUNTER — OFFICE VISIT (OUTPATIENT)
Dept: INTERNAL MEDICINE | Facility: CLINIC | Age: 43
End: 2018-08-29

## 2018-08-29 VITALS
OXYGEN SATURATION: 99 % | HEART RATE: 78 BPM | BODY MASS INDEX: 44.42 KG/M2 | TEMPERATURE: 98.5 F | WEIGHT: 283 LBS | DIASTOLIC BLOOD PRESSURE: 80 MMHG | HEIGHT: 67 IN | SYSTOLIC BLOOD PRESSURE: 120 MMHG

## 2018-08-29 DIAGNOSIS — J01.10 ACUTE NON-RECURRENT FRONTAL SINUSITIS: ICD-10-CM

## 2018-08-29 DIAGNOSIS — J01.00 ACUTE NON-RECURRENT MAXILLARY SINUSITIS: ICD-10-CM

## 2018-08-29 DIAGNOSIS — J02.9 SORE THROAT: Primary | ICD-10-CM

## 2018-08-29 LAB
EXPIRATION DATE: NORMAL
INTERNAL CONTROL: NORMAL
Lab: NORMAL
S PYO AG THROAT QL: NEGATIVE

## 2018-08-29 PROCEDURE — 87880 STREP A ASSAY W/OPTIC: CPT | Performed by: NURSE PRACTITIONER

## 2018-08-29 PROCEDURE — 99213 OFFICE O/P EST LOW 20 MIN: CPT | Performed by: NURSE PRACTITIONER

## 2018-08-29 RX ORDER — AZITHROMYCIN 250 MG/1
TABLET, FILM COATED ORAL
Qty: 6 TABLET | Refills: 0 | Status: SHIPPED | OUTPATIENT
Start: 2018-08-29 | End: 2018-09-12

## 2018-08-29 RX ORDER — DILTIAZEM HYDROCHLORIDE 120 MG/1
1 CAPSULE, EXTENDED RELEASE ORAL DAILY
COMMUNITY
Start: 2018-08-27 | End: 2018-10-08 | Stop reason: SDUPTHER

## 2018-08-29 NOTE — PROGRESS NOTES
Angela Davis is a 43 y.o. female who presents for sinus congestion and drainage, sore throat, and cough..    Chief Complaint   Patient presents with   • Sore Throat   • Earache   • Nasal Congestion       Patient states she has had sinus congestion and drainage for several days but woke up this morning around 1am with sore throat and worsening of congestion. Feels like she has had a fever but did not have a thermometer. She flies out of [place] tomorrow and is concerned about being sick.          Past Medical History:   Diagnosis Date   • Allergy    • Hypertension        Past Surgical History:   Procedure Laterality Date   • TONSILLECTOMY         Family History   Problem Relation Age of Onset   • Coronary artery disease Father 45        CAGB X3   • Heart disease Maternal Grandmother    • Heart disease Maternal Grandfather        Social History     Social History   • Marital status: Single     Spouse name: N/A   • Number of children: N/A   • Years of education: N/A     Occupational History   • Not on file.     Social History Main Topics   • Smoking status: Former Smoker     Quit date: 1/11/2014   • Smokeless tobacco: Never Used   • Alcohol use Yes      Comment: social   • Drug use: No   • Sexual activity: Yes     Other Topics Concern   • Not on file     Social History Narrative    Caffeine: 2 serving per day.           No Known Allergies    ROS    Review of Systems   Constitutional: Positive for chills and fatigue.   HENT: Positive for congestion, ear pain, postnasal drip, rhinorrhea, sinus pressure and sore throat.    Eyes: Negative.    Respiratory: Positive for cough.    Cardiovascular: Negative.    Gastrointestinal: Negative.    Genitourinary: Negative.    Musculoskeletal: Negative.    Skin: Negative.    Allergic/Immunologic: Positive for environmental allergies.   Neurological: Negative.    Psychiatric/Behavioral: Negative.        Vitals:    08/29/18 0925   BP: 120/80   Pulse: 78   Temp: 98.5 °F (36.9  "°C)   SpO2: 99%   Weight: 128 kg (283 lb)   Height: 170.2 cm (67.01\")         Current Outpatient Prescriptions:   •  albuterol (PROVENTIL HFA;VENTOLIN HFA) 108 (90 Base) MCG/ACT inhaler, Inhale 2 puffs Every 6 (Six) Hours As Needed for Shortness of Air., Disp: 1 inhaler, Rfl: 3  •  Azelastine-Fluticasone (DYMISTA) 137-50 MCG/ACT suspension, 1 spray into the nostril(s) as directed by provider Daily As Needed., Disp: , Rfl:   •  CARTIA  MG 24 hr capsule, Take 1 tablet by mouth Daily., Disp: , Rfl:   •  diltiazem XR (DILACOR XR) 120 MG 24 hr capsule, Take 1 capsule by mouth Daily., Disp: 30 capsule, Rfl: 3  •  levocetirizine (XYZAL) 5 MG tablet, Take 5 mg by mouth Every Evening., Disp: , Rfl:   •  levonorgestrel (MIRENA) 20 MCG/24HR IUD, 1 each by Intrauterine route 1 (One) Time., Disp: , Rfl:   •  losartan-hydrochlorothiazide (HYZAAR) 100-25 MG per tablet, Take 1 tablet by mouth Daily., Disp: 30 tablet, Rfl: 5  •  Multiple Vitamins-Minerals (MULTIVITAMIN ADULT PO), Take 1 tablet/day by mouth., Disp: , Rfl:   •  vitamin C (ASCORBIC ACID) 250 MG tablet, Take 250 mg by mouth Daily., Disp: , Rfl:   •  azithromycin (ZITHROMAX) 250 MG tablet, Take 2 tablets the first day, then 1 tablet daily for 4 days., Disp: 6 tablet, Rfl: 0    PE    Physical Exam   Constitutional: She is oriented to person, place, and time. She appears well-developed and well-nourished. No distress.   HENT:   Head: Normocephalic and atraumatic.   Right Ear: External ear and ear canal normal. A middle ear effusion is present.   Left Ear: External ear and ear canal normal. There is swelling. A middle ear effusion is present.   Nose: Mucosal edema, rhinorrhea and congestion present. Right sinus exhibits maxillary sinus tenderness and frontal sinus tenderness. Left sinus exhibits maxillary sinus tenderness and frontal sinus tenderness.   Mouth/Throat: Uvula is midline and mucous membranes are normal. Posterior oropharyngeal erythema present. No " oropharyngeal exudate, posterior oropharyngeal edema or tonsillar abscesses. Tonsils are 0 on the right. Tonsils are 0 on the left. No tonsillar exudate.   Eyes: Pupils are equal, round, and reactive to light. Conjunctivae and lids are normal. Right eye exhibits no discharge. Left eye exhibits no discharge.   Neck: Normal range of motion. Neck supple.   Cardiovascular: Normal rate, regular rhythm, normal heart sounds and intact distal pulses.  Exam reveals no gallop and no friction rub.    No murmur heard.  Pulmonary/Chest: Effort normal and breath sounds normal. No respiratory distress. She has no wheezes. She has no rales. She exhibits no tenderness.   Neurological: She is alert and oriented to person, place, and time.   Skin: Skin is warm and dry. She is not diaphoretic.   Psychiatric: She has a normal mood and affect. Her behavior is normal. Judgment and thought content normal.        A/P    Angela was seen today for sore throat, earache and nasal congestion.    Diagnoses and all orders for this visit:    Sore throat  -     POCT rapid strep A    Acute non-recurrent frontal sinusitis  -     azithromycin (ZITHROMAX) 250 MG tablet; Take 2 tablets the first day, then 1 tablet daily for 4 days.        Plan of care reviewed with patient at the conclusion of today's visit. Education was provided regarding diagnosis, management and any prescribed or recommended OTC medications.  Patient verbalizes understanding of and agreement with management plan.    Return if symptoms worsen or fail to improve.     STELLA Gilmore

## 2018-09-12 ENCOUNTER — OFFICE VISIT (OUTPATIENT)
Dept: INTERNAL MEDICINE | Facility: CLINIC | Age: 43
End: 2018-09-12

## 2018-09-12 VITALS
HEIGHT: 67 IN | DIASTOLIC BLOOD PRESSURE: 68 MMHG | SYSTOLIC BLOOD PRESSURE: 130 MMHG | TEMPERATURE: 98.3 F | OXYGEN SATURATION: 99 % | BODY MASS INDEX: 44.57 KG/M2 | HEART RATE: 75 BPM | WEIGHT: 284 LBS

## 2018-09-12 DIAGNOSIS — H66.003 ACUTE SUPPURATIVE OTITIS MEDIA OF BOTH EARS WITHOUT SPONTANEOUS RUPTURE OF TYMPANIC MEMBRANES, RECURRENCE NOT SPECIFIED: Primary | ICD-10-CM

## 2018-09-12 DIAGNOSIS — R51.9 ACUTE NONINTRACTABLE HEADACHE, UNSPECIFIED HEADACHE TYPE: ICD-10-CM

## 2018-09-12 PROCEDURE — 99213 OFFICE O/P EST LOW 20 MIN: CPT | Performed by: NURSE PRACTITIONER

## 2018-09-12 RX ORDER — AMOXICILLIN AND CLAVULANATE POTASSIUM 875; 125 MG/1; MG/1
1 TABLET, FILM COATED ORAL EVERY 12 HOURS SCHEDULED
Qty: 14 TABLET | Refills: 0 | Status: SHIPPED | OUTPATIENT
Start: 2018-09-12 | End: 2018-09-19

## 2018-09-12 RX ORDER — NAPROXEN 250 MG/1
250 TABLET ORAL 2 TIMES DAILY PRN
Qty: 30 TABLET | Refills: 0 | OUTPATIENT
Start: 2018-09-12 | End: 2018-11-24

## 2018-09-12 NOTE — PROGRESS NOTES
Chief Complaint   Patient presents with   • Earache   • Migraine       History of Present Illness  43 y.o.female presents for earache.    Seen 8-29 sore throat and sinusitis; tx with zpak.  Still having cough; this morning had headache and both ears hurt.  NO fever.  Uses dimesta nasal spray,     Review of Systems   Constitutional: Negative for chills and fever.   HENT: Positive for congestion, ear pain and postnasal drip. Negative for rhinorrhea, sinus pressure, sneezing and sore throat.    Respiratory: Positive for cough.    Neurological: Positive for headache.         PMSFH  The following portions of the patient's history were reviewed and updated as appropriate: allergies, current medications, past family history, past medical history, past social history, past surgical history and problem list.     Past Medical History:   Diagnosis Date   • Allergy    • Hypertension       Past Surgical History:   Procedure Laterality Date   • TONSILLECTOMY        No Known Allergies   Family History   Problem Relation Age of Onset   • Coronary artery disease Father 45        CAGB X3   • Heart disease Maternal Grandmother    • Heart disease Maternal Grandfather       Social History     Social History   • Marital status: Single     Spouse name: N/A   • Number of children: N/A   • Years of education: N/A     Occupational History   • Not on file.     Social History Main Topics   • Smoking status: Former Smoker     Quit date: 1/11/2014   • Smokeless tobacco: Never Used   • Alcohol use Yes      Comment: social   • Drug use: No   • Sexual activity: Yes     Other Topics Concern   • Not on file     Social History Narrative    Caffeine: 2 serving per day.             Current Outpatient Prescriptions:   •  albuterol (PROVENTIL HFA;VENTOLIN HFA) 108 (90 Base) MCG/ACT inhaler, Inhale 2 puffs Every 6 (Six) Hours As Needed for Shortness of Air., Disp: 1 inhaler, Rfl: 3  •  Azelastine-Fluticasone (DYMISTA) 137-50 MCG/ACT suspension, 1 spray  "into the nostril(s) as directed by provider Daily As Needed., Disp: , Rfl:   •  CARTIA  MG 24 hr capsule, Take 1 tablet by mouth Daily., Disp: , Rfl:   •  diltiazem XR (DILACOR XR) 120 MG 24 hr capsule, Take 1 capsule by mouth Daily., Disp: 30 capsule, Rfl: 3  •  levocetirizine (XYZAL) 5 MG tablet, Take 5 mg by mouth Every Evening., Disp: , Rfl:   •  levonorgestrel (MIRENA) 20 MCG/24HR IUD, 1 each by Intrauterine route 1 (One) Time., Disp: , Rfl:   •  losartan-hydrochlorothiazide (HYZAAR) 100-25 MG per tablet, Take 1 tablet by mouth Daily., Disp: 30 tablet, Rfl: 5  •  Multiple Vitamins-Minerals (MULTIVITAMIN ADULT PO), Take 1 tablet/day by mouth., Disp: , Rfl:   •  vitamin C (ASCORBIC ACID) 250 MG tablet, Take 250 mg by mouth Daily., Disp: , Rfl:     VITALS:  /68   Pulse 75   Temp 98.3 °F (36.8 °C)   Ht 170.2 cm (67.01\")   Wt 129 kg (284 lb)   SpO2 99%   BMI 44.47 kg/m²     Physical Exam   Constitutional: She is oriented to person, place, and time. She appears well-developed and well-nourished. No distress.   HENT:   Right Ear: External ear normal. Tympanic membrane is injected, erythematous and bulging. A middle ear effusion is present.   Left Ear: External ear normal. Tympanic membrane is injected, erythematous and bulging. A middle ear effusion is present.   Nose: Mucosal edema present.   Mouth/Throat: Mucous membranes are normal. Posterior oropharyngeal erythema present. No oropharyngeal exudate, posterior oropharyngeal edema or tonsillar abscesses.   Eyes: Pupils are equal, round, and reactive to light. Conjunctivae and EOM are normal.   Cardiovascular: Normal rate, regular rhythm and normal heart sounds.    Pulmonary/Chest: Effort normal and breath sounds normal. No respiratory distress. She has no wheezes. She has no rales.   Lymphadenopathy:        Head (right side): Posterior auricular adenopathy present. No submental, no submandibular, no tonsillar and no preauricular adenopathy present. "        Head (left side): Posterior auricular adenopathy present. No submental, no submandibular, no tonsillar and no preauricular adenopathy present.     She has no cervical adenopathy.   Neurological: She is alert and oriented to person, place, and time. She has normal strength. No sensory deficit. Coordination normal.   Psychiatric: She has a normal mood and affect. Her behavior is normal.       LABS  No new labs    ASSESSMENT/PLAN  Angela was seen today for earache and migraine.    Diagnoses and all orders for this visit:    Acute suppurative otitis media of both ears without spontaneous rupture of tympanic membranes, recurrence not specified  Comments:  continue nasal spray and antihistamine  Orders:  -     amoxicillin-clavulanate (AUGMENTIN) 875-125 MG per tablet; Take 1 tablet by mouth Every 12 (Twelve) Hours for 7 days.    Acute nonintractable headache, unspecified headache type  -     naproxen (NAPROSYN) 250 MG tablet; Take 1 tablet by mouth 2 (Two) Times a Day As Needed for Headache.        I discussed the patients findings and my recommendations with patient.     Patient was encouraged to keep me informed of any acute changes, lack of improvement, or any new concerning symptoms.    Patient voiced understanding of all instructions and denied further questions.      FOLLOW-UP  Return if symptoms worsen or fail to improve.    Electronically signed by:    STELLA Chen  09/12/2018

## 2018-10-05 NOTE — PROGRESS NOTES
[facility]  [facility]      Encounter Date:10/08/2018     Angela Davis  [address] [facility] [address]  [phone]    1975    Janet Campos APRN    Angela Davis is a 43 y.o. female.      Subjective:     Chief Complaint:  Hypertension       HPI     Follow up on dyspnea, hypertension, edema.  Patient has had an echocardiogram on 7/17/18 with normal EF, no significant valvular disease.  At that time patient hadn't been on prednisone for an allergic reaction.  Chest x-ray completed on 7/16/18 showed increased pulmonary congestion.  Cardiac PET completed on 7/26/18 with normal EF, no ischemia.  Dyspnea back to baseline, no chest pain.  Mild intermittent edema.  Patient is exercising.  Patient continued to have elevated blood pressures.  She was continued on losartan HCTZ.  Diltiazem initiated last office visit.    Pt states she is doing well.  No CP, pressure, syncope, dizziness, palpitations.  Dyspnea, intermittent, mild with exertion.  Has not been exercising and has had weight gain.          Patient Active Problem List    Diagnosis   • Hypertension [I10]   • Acute pulmonary edema (CMS/HCC) [J81.0]     Overview Note:     · Cardiac PET 7/26/18: EF 62%, no ischemia           Past Surgical History:   Procedure Laterality Date   • TONSILLECTOMY         No Known Allergies      Current Outpatient Prescriptions:   •  albuterol (PROVENTIL HFA;VENTOLIN HFA) 108 (90 Base) MCG/ACT inhaler, Inhale 2 puffs Every 6 (Six) Hours As Needed for Shortness of Air., Disp: 1 inhaler, Rfl: 3  •  Azelastine-Fluticasone (DYMISTA) 137-50 MCG/ACT suspension, 1 spray into the nostril(s) as directed by provider Daily As Needed., Disp: , Rfl:   •  diltiazem XR (DILACOR XR) 120 MG 24 hr capsule, Take 1 capsule by mouth Daily., Disp: 30 capsule, Rfl: 3  •  levocetirizine (XYZAL) 5 MG tablet, Take 5 mg by mouth Every Evening., Disp: , Rfl:   •  levonorgestrel (MIRENA) 20 MCG/24HR IUD, 1 each by  "Intrauterine route 1 (One) Time., Disp: , Rfl:   •  losartan-hydrochlorothiazide (HYZAAR) 100-25 MG per tablet, Take 1 tablet by mouth Daily., Disp: 30 tablet, Rfl: 5  •  Multiple Vitamins-Minerals (MULTIVITAMIN ADULT PO), Take 1 tablet/day by mouth., Disp: , Rfl:   •  naproxen (NAPROSYN) 250 MG tablet, Take 1 tablet by mouth 2 (Two) Times a Day As Needed for Headache., Disp: 30 tablet, Rfl: 0  •  vitamin C (ASCORBIC ACID) 250 MG tablet, Take 250 mg by mouth Daily., Disp: , Rfl:     The following portions of the patient's history were reviewed and updated as appropriate: allergies, current medications, past family history, past medical history, past social history, past surgical history and problem list.    Review of Systems   Constitution: Positive for malaise/fatigue and weight gain.   Cardiovascular: Positive for leg swelling.   Respiratory: Positive for shortness of breath.    All other systems reviewed and are negative.      Objective:     Vitals:    10/08/18 0905 10/08/18 0908 10/08/18 0909 10/08/18 0926   BP: 141/80 141/93 141/91 135/76   BP Location: Right arm Left arm Left arm    Patient Position: Sitting Sitting Standing    Cuff Size: Adult Adult Adult    Pulse: 89 92 91    Resp: 20      Temp: 97.6 °F (36.4 °C)      TempSrc: Temporal Artery       SpO2: 96%      Weight: 136 kg (299 lb 6 oz)      Height: 170.2 cm (67.01\")            Physical Exam   Constitutional: She is oriented to person, place, and time. She appears well-developed and well-nourished. No distress.   HENT:   Head: Normocephalic and atraumatic.   Mouth/Throat: Oropharynx is clear and moist.   Eyes: Pupils are equal, round, and reactive to light. Conjunctivae are normal. No scleral icterus.   Neck: No hepatojugular reflux and no JVD present. Carotid bruit is not present. No tracheal deviation present. No thyromegaly present.   Cardiovascular: Normal rate, regular rhythm, normal heart sounds and intact distal pulses.  Exam reveals no friction " rub.    No murmur heard.  Pulmonary/Chest: Effort normal and breath sounds normal.   Abdominal: Soft. Bowel sounds are normal. She exhibits no distension. There is no tenderness.   Musculoskeletal: She exhibits no edema.   Lymphadenopathy:     She has no cervical adenopathy.   Neurological: She is alert and oriented to person, place, and time.   Skin: Skin is warm, dry and intact. No rash noted. No cyanosis or erythema. No pallor.   Psychiatric: She has a normal mood and affect. Her behavior is normal. Thought content normal.   Vitals reviewed.      Lab and Diagnostic Review:  Lab Results   Component Value Date    WBC 10.47 07/19/2018    HGB 10.7 (L) 07/19/2018    HCT 32.8 (L) 07/19/2018    MCV 91.1 07/19/2018     07/19/2018     Lab Results   Component Value Date    GLUCOSE 84 07/24/2018    BUN 17 07/24/2018    CREATININE 1.04 07/24/2018    EGFRIFNONA 58 (L) 07/24/2018    BCR 16.3 07/24/2018    K 4.7 07/24/2018    CO2 32.0 (H) 07/24/2018    CALCIUM 10.1 07/24/2018    ALBUMIN 3.98 07/19/2018    AST 24 07/19/2018    ALT 45 (H) 07/19/2018       Assessment and Plan:         1. Essential hypertension  Continue meds.  Continue to monitor    2. Lower extremity edema  Improved      3. Morbid obesity (CMS/HCC)  Diet and exercise modifications for weight loss.    F/u with PCP as scheduled.    F/u PRN    *Please note that portions of this note were completed with a voice recognition program. Efforts were made to edit the dictations, but occasionally words are mistranscribed.

## 2018-10-08 ENCOUNTER — OFFICE VISIT (OUTPATIENT)
Dept: CARDIOLOGY | Facility: HOSPITAL | Age: 43
End: 2018-10-08

## 2018-10-08 VITALS
OXYGEN SATURATION: 96 % | BODY MASS INDEX: 45.99 KG/M2 | DIASTOLIC BLOOD PRESSURE: 76 MMHG | RESPIRATION RATE: 20 BRPM | TEMPERATURE: 97.6 F | SYSTOLIC BLOOD PRESSURE: 135 MMHG | HEIGHT: 67 IN | WEIGHT: 293 LBS | HEART RATE: 91 BPM

## 2018-10-08 DIAGNOSIS — E66.01 MORBID OBESITY (HCC): ICD-10-CM

## 2018-10-08 DIAGNOSIS — I10 ESSENTIAL HYPERTENSION: Primary | ICD-10-CM

## 2018-10-08 DIAGNOSIS — R60.0 LOWER EXTREMITY EDEMA: ICD-10-CM

## 2018-10-08 PROCEDURE — 99213 OFFICE O/P EST LOW 20 MIN: CPT | Performed by: NURSE PRACTITIONER

## 2019-01-16 PROBLEM — J01.00 ACUTE MAXILLARY SINUSITIS: Status: ACTIVE | Noted: 2019-01-16

## 2019-04-16 ENCOUNTER — OFFICE VISIT (OUTPATIENT)
Dept: INTERNAL MEDICINE | Facility: CLINIC | Age: 44
End: 2019-04-16

## 2019-04-16 VITALS
TEMPERATURE: 98.9 F | WEIGHT: 293 LBS | DIASTOLIC BLOOD PRESSURE: 80 MMHG | SYSTOLIC BLOOD PRESSURE: 130 MMHG | OXYGEN SATURATION: 97 % | HEIGHT: 67 IN | BODY MASS INDEX: 45.99 KG/M2 | HEART RATE: 94 BPM

## 2019-04-16 DIAGNOSIS — L73.9 FOLLICULITIS: ICD-10-CM

## 2019-04-16 DIAGNOSIS — H60.502 ACUTE OTITIS EXTERNA OF LEFT EAR, UNSPECIFIED TYPE: ICD-10-CM

## 2019-04-16 DIAGNOSIS — R09.81 SINUS CONGESTION: ICD-10-CM

## 2019-04-16 DIAGNOSIS — H66.002 ACUTE SUPPURATIVE OTITIS MEDIA OF LEFT EAR WITHOUT SPONTANEOUS RUPTURE OF TYMPANIC MEMBRANE, RECURRENCE NOT SPECIFIED: Primary | ICD-10-CM

## 2019-04-16 PROCEDURE — 99213 OFFICE O/P EST LOW 20 MIN: CPT | Performed by: NURSE PRACTITIONER

## 2019-04-16 RX ORDER — AMOXICILLIN AND CLAVULANATE POTASSIUM 875; 125 MG/1; MG/1
1 TABLET, FILM COATED ORAL 2 TIMES DAILY
Qty: 20 TABLET | Refills: 0 | Status: SHIPPED | OUTPATIENT
Start: 2019-04-16 | End: 2019-04-26

## 2019-04-16 RX ORDER — OFLOXACIN 3 MG/ML
5 SOLUTION AURICULAR (OTIC) DAILY
Qty: 2 ML | Refills: 0 | Status: SHIPPED | OUTPATIENT
Start: 2019-04-16 | End: 2019-04-23

## 2019-04-16 NOTE — PROGRESS NOTES
Chief Complaint   Patient presents with   • Sinusitis     head is tender from pressure   • bumps     in her hair       History of Present Illness  44 y.o.female presents for sinusitis, earache, bumps in hair.  Initial onset of symptoms last Tuesday 1 week ago with a headache/migraine.  Her left side of her face though stayed a little tender congested.  Over the weekend she was at her mother's house and her face appeared swollen.  Yesterday she started having pain behind the ear and now down the neck and in front of the ear. Her maxillary area is  swollen.  She also noticed some increase in bumps along the top of her head including scalp that are tender to touch and look like acne which is unusual for her.      Review of Systems   Constitutional: Negative for chills and fever.   HENT: Positive for congestion, ear pain, facial swelling and sinus pressure. Negative for hearing loss, postnasal drip, rhinorrhea, sneezing and sore throat.    Respiratory: Negative for cough and shortness of breath.    Musculoskeletal: Negative for myalgias.   Neurological: Positive for headache.       [facility]  The following portions of the patient's history were reviewed and updated as appropriate: allergies, current medications, past family history, past medical history, past social history, past surgical history and problem list.       Past Medical History:   Diagnosis Date   • Allergy    • Hypertension       Past Surgical History:   Procedure Laterality Date   • TONSILLECTOMY        No Known Allergies   Family History   Problem Relation Age of Onset   • Coronary artery disease Father 45        CAGB X3   • Heart disease Maternal Grandmother    • Heart disease Maternal Grandfather             Current Outpatient Medications:   •  levocetirizine (XYZAL) 5 MG tablet, Take 5 mg by mouth Every Evening., Disp: , Rfl:   •  levonorgestrel (MIRENA) 20 MCG/24HR IUD, 1 each by Intrauterine route 1 (One) Time., Disp: , Rfl:   •   "losartan-hydrochlorothiazide (HYZAAR) 100-25 MG per tablet, Take 1 tablet by mouth Daily., Disp: 30 tablet, Rfl: 5  •  Multiple Vitamins-Minerals (MULTIVITAMIN ADULT PO), Take 1 tablet/day by mouth., Disp: , Rfl:   •  vitamin C (ASCORBIC ACID) 250 MG tablet, Take 250 mg by mouth Daily., Disp: , Rfl:     VITALS:  /80   Pulse 94   Temp 98.9 °F (37.2 °C)   Ht 170.2 cm (67\")   Wt 136 kg (300 lb)   SpO2 97%   BMI 46.99 kg/m²     Physical Exam   Constitutional: She is oriented to person, place, and time. She appears well-developed and well-nourished. No distress.   HENT:   Right Ear: Tympanic membrane, external ear and ear canal normal.   Left Ear: There is swelling and tenderness. There is mastoid tenderness. Tympanic membrane is injected. Tympanic membrane is not perforated, not erythematous and not bulging. A middle ear effusion is present.   Nose: Mucosal edema, rhinorrhea and congestion present. Right sinus exhibits no maxillary sinus tenderness and no frontal sinus tenderness. Left sinus exhibits maxillary sinus tenderness. Left sinus exhibits no frontal sinus tenderness.   Mouth/Throat: Mucous membranes are normal. Posterior oropharyngeal erythema present. No oropharyngeal exudate, posterior oropharyngeal edema or tonsillar abscesses.   Eyes: Conjunctivae are normal. Pupils are equal, round, and reactive to light. Right eye exhibits no discharge. Left eye exhibits no discharge.   Neck: Normal range of motion.   Cardiovascular: Normal rate, regular rhythm and normal heart sounds.   Pulmonary/Chest: Effort normal and breath sounds normal.   Lymphadenopathy:        Head (right side): No submental, no submandibular and no tonsillar adenopathy present.        Head (left side): Tonsillar adenopathy present. No submental and no submandibular adenopathy present.     She has no cervical adenopathy.   Neurological: She is alert and oriented to person, place, and time.   Skin: Skin is warm and dry.       LABS  No " new labs.    ASSESSMENT/PLAN  Angela was seen today for sinusitis and bumps.    Diagnoses and all orders for this visit:    Acute suppurative otitis media of left ear without spontaneous rupture of tympanic membrane, recurrence not specified  -     ofloxacin (FLOXIN) 0.3 % otic solution; Administer 5 drops into the left ear Daily for 7 days.    Acute otitis externa of left ear, unspecified type  -     amoxicillin-clavulanate (AUGMENTIN) 875-125 MG per tablet; Take 1 tablet by mouth 2 (Two) Times a Day for 10 days.    Sinus congestion  Comments:  oral antihistamine; add nasal steroid spray.    Folliculitis  Comments:  on scalp; abx should help; can also try tea tree shampoo    she may have an early sinusitis, but I think most of her pain swelling is coming from the infected ear.  Will tx with augmentin that will also cover sinus.  If worsening of symptoms or no improvement in symptoms or fever > 101.5 patient should contact our office for further evaluation treatment or seek urgent care.    I discussed the patients findings and my recommendations with patient.  Patient was encouraged to keep me informed of any acute changes, lack of improvement, or any new concerning symptoms.    Patient voiced understanding of all instructions and denied further questions.      FOLLOW-UP  Return if symptoms worsen or fail to improve.    Electronically signed by:    STELLA Chen  04/16/2019

## 2020-02-18 ENCOUNTER — TELEPHONE (OUTPATIENT)
Dept: INTERNAL MEDICINE | Facility: CLINIC | Age: 45
End: 2020-02-18

## 2022-07-20 ENCOUNTER — TRANSCRIBE ORDERS (OUTPATIENT)
Dept: ADMINISTRATIVE | Facility: HOSPITAL | Age: 47
End: 2022-07-20

## 2022-07-20 DIAGNOSIS — Z12.31 VISIT FOR SCREENING MAMMOGRAM: Primary | ICD-10-CM

## 2022-07-28 ENCOUNTER — APPOINTMENT (OUTPATIENT)
Dept: OTHER | Facility: HOSPITAL | Age: 47
End: 2022-07-28

## 2022-07-28 ENCOUNTER — HOSPITAL ENCOUNTER (OUTPATIENT)
Dept: MAMMOGRAPHY | Facility: HOSPITAL | Age: 47
Discharge: HOME | End: 2022-07-28
Admitting: NURSE PRACTITIONER

## 2022-07-28 DIAGNOSIS — Z92.89 HISTORY OF MAMMOGRAM: ICD-10-CM

## 2022-07-28 DIAGNOSIS — Z12.31 VISIT FOR SCREENING MAMMOGRAM: ICD-10-CM

## 2022-07-28 PROCEDURE — 77067 SCR MAMMO BI INCL CAD: CPT

## 2022-07-28 PROCEDURE — 77063 BREAST TOMOSYNTHESIS BI: CPT

## 2022-07-28 PROCEDURE — 77063 BREAST TOMOSYNTHESIS BI: CPT | Performed by: RADIOLOGY

## 2022-07-28 PROCEDURE — 77067 SCR MAMMO BI INCL CAD: CPT | Performed by: RADIOLOGY

## 2023-10-30 ENCOUNTER — TRANSCRIBE ORDERS (OUTPATIENT)
Dept: ADMINISTRATIVE | Facility: HOSPITAL | Age: 48
End: 2023-10-30
Payer: COMMERCIAL

## 2023-10-30 DIAGNOSIS — Z12.31 VISIT FOR SCREENING MAMMOGRAM: Primary | ICD-10-CM

## 2023-12-19 ENCOUNTER — HOSPITAL ENCOUNTER (OUTPATIENT)
Dept: MAMMOGRAPHY | Facility: HOSPITAL | Age: 48
Discharge: HOME | End: 2023-12-19
Admitting: NURSE PRACTITIONER
Payer: COMMERCIAL

## 2023-12-19 DIAGNOSIS — Z12.31 VISIT FOR SCREENING MAMMOGRAM: ICD-10-CM

## 2023-12-19 PROCEDURE — 77067 SCR MAMMO BI INCL CAD: CPT

## 2023-12-19 PROCEDURE — 77063 BREAST TOMOSYNTHESIS BI: CPT

## 2025-01-22 ENCOUNTER — TRANSCRIBE ORDERS (OUTPATIENT)
Dept: ADMINISTRATIVE | Facility: HOSPITAL | Age: 50
End: 2025-01-22

## 2025-01-22 DIAGNOSIS — Z12.31 VISIT FOR SCREENING MAMMOGRAM: Primary | ICD-10-CM

## 2025-01-24 LAB
NCCN CRITERIA FLAG: NORMAL
TYRER CUZICK SCORE: 8.5

## 2025-02-06 ENCOUNTER — HOSPITAL ENCOUNTER (OUTPATIENT)
Dept: MAMMOGRAPHY | Facility: HOSPITAL | Age: 50
Discharge: HOME | End: 2025-02-06
Payer: COMMERCIAL

## 2025-02-06 DIAGNOSIS — Z12.31 VISIT FOR SCREENING MAMMOGRAM: ICD-10-CM

## 2025-05-23 ENCOUNTER — HOSPITAL ENCOUNTER (OUTPATIENT)
Dept: MAMMOGRAPHY | Facility: HOSPITAL | Age: 50
Discharge: HOME | End: 2025-05-23
Admitting: NURSE PRACTITIONER
Payer: COMMERCIAL

## 2025-05-23 PROCEDURE — 77063 BREAST TOMOSYNTHESIS BI: CPT

## 2025-05-23 PROCEDURE — 77067 SCR MAMMO BI INCL CAD: CPT
